# Patient Record
Sex: MALE | Race: WHITE | NOT HISPANIC OR LATINO
[De-identification: names, ages, dates, MRNs, and addresses within clinical notes are randomized per-mention and may not be internally consistent; named-entity substitution may affect disease eponyms.]

---

## 2022-07-07 ENCOUNTER — APPOINTMENT (OUTPATIENT)
Dept: SURGERY | Facility: CLINIC | Age: 77
End: 2022-07-07

## 2022-07-07 VITALS — HEIGHT: 68 IN | BODY MASS INDEX: 28.79 KG/M2 | WEIGHT: 190 LBS

## 2022-07-07 PROCEDURE — 99203 OFFICE O/P NEW LOW 30 MIN: CPT

## 2022-07-07 NOTE — ASSESSMENT
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  He is an avid fisherman.\par \par Physical examination demonstrates a large plum sized bulge in the right groin which is reducible with minimal to moderate difficulty consistent with a large symptomatic protruding right inguinal hernia warranting surgical repair.  There is no evidence of incarceration or strangulation, and the patient denies any symptoms of obstruction.  Examination of his left groin demonstrates a mild to moderate weakness but no obvious hernia.  Both testicles are normal.  His umbilical examination is unremarkable.  He is moderately overweight with a current BMI of 29.\par \par I explained the pros and cons of surgery, as well as all risks, benefits, indications and alternatives of the procedure and the patient understood and agreed.  Asad has a trip planned with his extended family to [facility] in late August.  He was scheduled for the repair of his right inguinal hernia with mesh on Thursday, September 15, 2022 under LOCAL with IV SED[facility] with presurgical testing waived.  He was encouraged to avoid heavy lifting and strenuous activity in the interim, of course.\par \par  We also discussed the importance of calorie restriction and healthy eating with regard to weight loss, hernia recurrence and his overall health.  He was given a prescription for a right inguinal truss to wear while fishing on vacation to minimize the risk of incarceration, obstruction and/or strangulation.

## 2022-07-07 NOTE — PHYSICAL EXAM
[JVD] : no jugular venous distention  [Normal Breath Sounds] : Normal breath sounds [No Rash or Lesion] : No rash or lesion [Alert] : alert [Calm] : calm [de-identified] : Healthy [de-identified] : Normal [de-identified] : Mildly protuberant abdomen [de-identified] : Normal testicles [de-identified] : Right inguinal hernia, reducible

## 2022-07-17 ENCOUNTER — LABORATORY RESULT (OUTPATIENT)
Age: 77
End: 2022-07-17

## 2022-07-19 NOTE — ASU PATIENT PROFILE, ADULT - FALL HARM RISK - UNIVERSAL INTERVENTIONS
Bed in lowest position, wheels locked, appropriate side rails in place/Call bell, personal items and telephone in reach/Instruct patient to call for assistance before getting out of bed or chair/Non-slip footwear when patient is out of bed/[place] to call system/Physically safe environment - no spills, clutter or unnecessary equipment/Purposeful Proactive Rounding/Room/bathroom lighting operational, light cord in reach

## 2022-07-20 ENCOUNTER — TRANSCRIPTION ENCOUNTER (OUTPATIENT)
Age: 77
End: 2022-07-20

## 2022-07-20 ENCOUNTER — OUTPATIENT (OUTPATIENT)
Dept: OUTPATIENT SERVICES | Facility: HOSPITAL | Age: 77
LOS: 1 days | Discharge: HOME | End: 2022-07-20

## 2022-07-20 ENCOUNTER — APPOINTMENT (OUTPATIENT)
Dept: SURGERY | Facility: AMBULATORY SURGERY CENTER | Age: 77
End: 2022-07-20

## 2022-07-20 VITALS
OXYGEN SATURATION: 97 % | TEMPERATURE: 98 F | RESPIRATION RATE: 18 BRPM | DIASTOLIC BLOOD PRESSURE: 68 MMHG | WEIGHT: 190.04 LBS | SYSTOLIC BLOOD PRESSURE: 151 MMHG | HEIGHT: 68 IN | HEART RATE: 62 BPM

## 2022-07-20 VITALS — SYSTOLIC BLOOD PRESSURE: 139 MMHG | HEART RATE: 68 BPM | RESPIRATION RATE: 17 BRPM | OXYGEN SATURATION: 98 %

## 2022-07-20 DIAGNOSIS — Z96.641 PRESENCE OF RIGHT ARTIFICIAL HIP JOINT: Chronic | ICD-10-CM

## 2022-07-20 DIAGNOSIS — Z98.890 OTHER SPECIFIED POSTPROCEDURAL STATES: Chronic | ICD-10-CM

## 2022-07-20 PROCEDURE — 49505 PRP I/HERN INIT REDUC >5 YR: CPT | Mod: RT

## 2022-07-20 RX ORDER — LOSARTAN POTASSIUM 100 MG/1
1 TABLET, FILM COATED ORAL
Qty: 0 | Refills: 0 | DISCHARGE

## 2022-07-20 RX ORDER — TRAMADOL HYDROCHLORIDE 50 MG/1
1 TABLET ORAL
Qty: 20 | Refills: 0
Start: 2022-07-20 | End: 2022-07-23

## 2022-07-20 NOTE — ASU DISCHARGE PLAN (ADULT/PEDIATRIC) - CARE PROVIDER_API CALL
Braulio Sifuentes)  Surgery  [address]  [place] [address]  Phone: [phone]  Fax: [phone]  Scheduled Appointment: 07/27/2022

## 2022-07-20 NOTE — CHART NOTE - NSCHARTNOTEFT_GEN_A_CORE
PACU ANESTHESIA ADMISSION NOTE      Procedure: Repair of right inguinal hernia with mesh      Post op diagnosis:  Inguinal hernia, right        ____  Intubated  TV:______       Rate: ______      FiO2: ______    __x__  Patent Airway    ____  Full return of protective reflexes    ____  Full recovery from anesthesia / back to baseline status    Vitals:  T(C): 36.6   HR: 62  BP: 151/68   RR: 18   SpO2: 97%     Mental Status:  __x__ Awake   _____ Alert   _____ Drowsy   _____ Sedated    Nausea/Vomiting:  ____ Yes, See Post - Op Orders      ___x_ No    Pain Scale (0-10):  _____    Treatment: ____ None    ___x_ See Post - Op/PCA Orders    Post - Operative Fluids:   ____ Oral   __x__ See Post - Op Orders    Plan: Discharge:   __x__Home       _____Floor     _____Critical Care    _____  Other:_________________    Comments:  report given to RN DC to pacu

## 2022-07-20 NOTE — ASU DISCHARGE PLAN (ADULT/PEDIATRIC) - NS MD DC FALL RISK RISK
For information on Fall & Injury Prevention, visit: [email or web address] OR  [email or web address] OR  https://www.cdc.gov/steadi/patient.html

## 2022-07-20 NOTE — ASU PREOP CHECKLIST - PATIENT'S PERSONAL PROPERTY GIVEN TO
Patient Education     Arthralgia    Arthralgia is the term for pain in or around the joint. It is a symptom, not a disease. This pain may involve one or more joints. In some cases, the pain moves from joint to joint.  There are many causes for joint pain. These include:    Injury    Osteoarthritis (wearing out of the joint surface)    Gout (inflammation of the joint due to crystals in the joint fluid)    Infection inside the joint      Bursitis (inflammation of the fluid-filled sacs around the joint)    Autoimmune disorders such as rheumatoid arthritis or lupus    Tendonitis (inflammation of chords that attach muscle to bone)  Home care    Rest the involved joint(s) until your symptoms improve.     You may be prescribed pain medicine. If none is prescribed, you may use acetaminophen or ibuprofen to control pain and inflammation.  Follow-up care  Follow up with your healthcare provider or as advised.  When to seek medical advice  Contact your healthcare provider right away if any of the following occurs:    Pain, swelling, or redness of joint increases    Pain worsens or recurs after a period of improvement    Pain moves to other joints    You cannot bear weight on the affected joint     You cannot move the affected joint    Joint appears deformed    New rash appears    Fever of 100.4 F (38 C) or higher, or as directed by your healthcare provider  Date Last Reviewed: 3/1/2017    0126-4839 [employer]. [address] [place] [address], [place] [address]. All rights reserved. This information is not intended as a substitute for professional medical care. Always follow your healthcare professional's instructions.           
locker/on unit

## 2022-07-20 NOTE — ASU PREOP CHECKLIST - AS BP NONINV METHOD
Additional Notes: Patient consent was obtained to proceed with the visit and recommended plan of care after discussion of all risks and benefits, including the risks of COVID-19 exposure. electronic

## 2022-07-24 DIAGNOSIS — Z88.0 ALLERGY STATUS TO PENICILLIN: ICD-10-CM

## 2022-07-24 DIAGNOSIS — K40.90 UNILATERAL INGUINAL HERNIA, WITHOUT OBSTRUCTION OR GANGRENE, NOT SPECIFIED AS RECURRENT: ICD-10-CM

## 2022-07-28 ENCOUNTER — APPOINTMENT (OUTPATIENT)
Dept: SURGERY | Facility: CLINIC | Age: 77
End: 2022-07-28

## 2022-07-28 DIAGNOSIS — K40.90 UNILATERAL INGUINAL HERNIA, W/OUT OBSTRUCTION OR GANGRENE, NOT SPECIFIED AS RECURRENT: ICD-10-CM

## 2022-07-28 PROCEDURE — 99024 POSTOP FOLLOW-UP VISIT: CPT

## 2022-07-28 NOTE — CONSULT LETTER
[Dear  ___] : Dear  [unfilled], [Courtesy Letter:] : I had the pleasure of seeing your patient, [unfilled], in my office today. [Please see my note below.] : Please see my note below. [Consult Closing:] : Thank you very much for allowing me to participate in the care of this patient.  If you have any questions, please do not hesitate to contact me. [FreeTextEntry3] : \par Sincerely,\par \par Rebecca Mccarthy PA-C, [facility]\par

## 2022-07-28 NOTE — ASSESSMENT
[FreeTextEntry1] : CICI SHAW underwent a very large right inguinal hernia repair with mesh with Dr. Sifuentes on 7/20/22  under local IV sedation without any problems or complications. His wound is clean, dry and intact. There is no evidence of erythema, seroma formation or infection. He is tolerating a diet and having normal bowel movements. He denies any significant postoperative pain or discomfort at this time.\par \par He was counseled and reassured. CICI was discharged from the office with no specific follow up necessary, but he knows to avoid any heavy lifting or strenuous activity for the next several weeks. \par \par \par \par

## 2023-11-23 ENCOUNTER — NON-APPOINTMENT (OUTPATIENT)
Age: 78
End: 2023-11-23

## 2024-02-09 PROBLEM — Q05.9 SPINA BIFIDA, UNSPECIFIED: Chronic | Status: ACTIVE | Noted: 2022-07-20

## 2024-02-09 PROBLEM — I10 ESSENTIAL (PRIMARY) HYPERTENSION: Chronic | Status: ACTIVE | Noted: 2022-07-20

## 2024-02-09 PROBLEM — M86.10 OTHER ACUTE OSTEOMYELITIS, UNSPECIFIED SITE: Chronic | Status: ACTIVE | Noted: 2022-07-20

## 2024-04-04 ENCOUNTER — APPOINTMENT (OUTPATIENT)
Dept: NEUROSURGERY | Facility: CLINIC | Age: 79
End: 2024-04-04
Payer: MEDICARE

## 2024-04-04 ENCOUNTER — NON-APPOINTMENT (OUTPATIENT)
Age: 79
End: 2024-04-04

## 2024-04-04 VITALS — WEIGHT: 175 LBS | BODY MASS INDEX: 26.52 KG/M2 | HEIGHT: 68 IN

## 2024-04-04 DIAGNOSIS — H40.9 UNSPECIFIED GLAUCOMA: ICD-10-CM

## 2024-04-04 DIAGNOSIS — Z78.9 OTHER SPECIFIED HEALTH STATUS: ICD-10-CM

## 2024-04-04 DIAGNOSIS — G47.30 SLEEP APNEA, UNSPECIFIED: ICD-10-CM

## 2024-04-04 DIAGNOSIS — R32 UNSPECIFIED URINARY INCONTINENCE: ICD-10-CM

## 2024-04-04 DIAGNOSIS — Z82.49 FAMILY HISTORY OF ISCHEMIC HEART DISEASE AND OTHER DISEASES OF THE CIRCULATORY SYSTEM: ICD-10-CM

## 2024-04-04 DIAGNOSIS — I10 ESSENTIAL (PRIMARY) HYPERTENSION: ICD-10-CM

## 2024-04-04 LAB
ALBUMIN SERPL ELPH-MCNC: 4.3 G/DL
ALP BLD-CCNC: 74 U/L
ALT SERPL-CCNC: 16 U/L
ANION GAP SERPL CALC-SCNC: 13 MMOL/L
AST SERPL-CCNC: 23 U/L
BILIRUB SERPL-MCNC: 0.4 MG/DL
BUN SERPL-MCNC: 18 MG/DL
CALCIUM SERPL-MCNC: 9.4 MG/DL
CHLORIDE SERPL-SCNC: 104 MMOL/L
CO2 SERPL-SCNC: 24 MMOL/L
CREAT SERPL-MCNC: 0.7 MG/DL
EGFR: 93 ML/MIN/1.73M2
GLUCOSE SERPL-MCNC: 98 MG/DL
POTASSIUM SERPL-SCNC: 4 MMOL/L
PROT SERPL-MCNC: 6.8 G/DL
SODIUM SERPL-SCNC: 141 MMOL/L

## 2024-04-04 PROCEDURE — 99204 OFFICE O/P NEW MOD 45 MIN: CPT

## 2024-04-04 NOTE — ASSESSMENT
[FreeTextEntry1] : 80yo M with a history of spinal surgery in 1995 for a lipoma removal followed by a CSF leak repair. Back pain and RLE radiculopathy commenced shortly after surgery. No recent physical therapy or interventional procedures.  Patient will complete new imaging in the form of MRIs cervical, thoracic and lumbar. Depending upon results, a possible discussion of a L5-S1 DRG can take place.   No concerns were identified during the visit today and all questions have been addressed.    I, Chaya De Anda, MS, FNP-BC, attest that this documentation has been prepared under the direction in and the presence of provider, Yissel Ireland MD, FAANS.    ATTESTATION: I personally reviewed with the PA/NP, this patient's history and physical exam findings, as documented above. I have discussed the relevant areas of concern, having direct implications to the presenting problems and illnesses, and I have personally examined all pertinent and positive and negative findings, which impact their neurosurgical treatment.    I, Dr. Ireland, attest that this documentation has been prepared by Chaya De Anda under my presence and direction

## 2024-04-04 NOTE — REVIEW OF SYSTEMS
[Negative] : Heme/Lymph [Difficulty Walking] : difficulty walking [As Noted in HPI] : as noted in HPI [Incontinence] : incontinence

## 2024-04-04 NOTE — HISTORY OF PRESENT ILLNESS
[de-identified] : Mr. SHAW is a pleasant 79-year-old RH male presenting to the neurosurgery office today for an initial consultation regarding his low back pain and RLE radiculopathy.   As a review, patient has undergone on 3/22/1995 removal of a lipoma and then on 4/22/1995 repair of a CSF leak. Patient endorses that he did not have any pain until after these surgeries. He now suffers with low back pain and RLE radiculopathy in a posterior fashion extending to his foot, all toes are numb. He has no ability for dorsiflexion of right foot. Ambulates with a rollator for increased safety measures. No recent falls. He has been incontinent of bladder since the surgeries in 1995. He consulted with Dr. Belle of pain management in the past but has not undergone any interventional procedures in over 10 years. No recent physical therapy. Advil is taken for relief.   DIAGNOSTIC TESTING: MRI Lumbar at 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   The patient has agreed to complete additional imaging in the form of a MRIs of the cervical, thoracic and a contrast lumbar. Upon review of the imaging, a L5-S1 DRG may be considered to target the RLE radiculopathy. All questions were answered today and he was grateful for the visit.   PHYSICAL EXAM: Constitutional: Well appearing, no distress HEENT: Normocephalic Atraumatic Psychiatric: Alert and oriented to all spheres, normal mood Pulmonary: No respiratory distress  Neurologic: CN II-XII grossly intact Palpation: + paravertebral tenderness to lower lumbar spine upon palpation  Strength: Full strength in all major muscle groups, no atrophy except 0/5 dorsiflexion to R foot  Sensation: Full sensation to light touch in all extremities Reflexes: 2+ patellar 2+ biceps 2+ ankle jerk  No Wilhelm's No Clonus  ROM  SLR negative b/l Gait: Antalgic

## 2024-04-04 NOTE — PHYSICAL EXAM
[Oriented To Time, Place, And Person] : oriented to person, place, and time [Person] : oriented to person [Place] : oriented to place [Time] : oriented to time [0] : L4/5 ankle dorsiflexors 0/5 [4] : L4/5 ankle dorsiflexors 4/5

## 2024-04-08 ENCOUNTER — OUTPATIENT (OUTPATIENT)
Dept: OUTPATIENT SERVICES | Facility: HOSPITAL | Age: 79
LOS: 1 days | End: 2024-04-08
Payer: MEDICARE

## 2024-04-08 DIAGNOSIS — Z98.890 OTHER SPECIFIED POSTPROCEDURAL STATES: Chronic | ICD-10-CM

## 2024-04-08 DIAGNOSIS — Z00.8 ENCOUNTER FOR OTHER GENERAL EXAMINATION: ICD-10-CM

## 2024-04-08 DIAGNOSIS — Q06.8 OTHER SPECIFIED CONGENITAL MALFORMATIONS OF SPINAL CORD: ICD-10-CM

## 2024-04-08 PROCEDURE — 72146 MRI CHEST SPINE W/O DYE: CPT

## 2024-04-08 PROCEDURE — 72146 MRI CHEST SPINE W/O DYE: CPT | Mod: 26,MH

## 2024-04-09 ENCOUNTER — NON-APPOINTMENT (OUTPATIENT)
Age: 79
End: 2024-04-09

## 2024-04-09 DIAGNOSIS — Q06.8 OTHER SPECIFIED CONGENITAL MALFORMATIONS OF SPINAL CORD: ICD-10-CM

## 2024-05-03 ENCOUNTER — RESULT REVIEW (OUTPATIENT)
Age: 79
End: 2024-05-03

## 2024-05-03 ENCOUNTER — OUTPATIENT (OUTPATIENT)
Dept: OUTPATIENT SERVICES | Facility: HOSPITAL | Age: 79
LOS: 1 days | End: 2024-05-03
Payer: MEDICARE

## 2024-05-03 DIAGNOSIS — Z96.641 PRESENCE OF RIGHT ARTIFICIAL HIP JOINT: Chronic | ICD-10-CM

## 2024-05-03 DIAGNOSIS — Z00.8 ENCOUNTER FOR OTHER GENERAL EXAMINATION: ICD-10-CM

## 2024-05-03 DIAGNOSIS — Q06.8 OTHER SPECIFIED CONGENITAL MALFORMATIONS OF SPINAL CORD: ICD-10-CM

## 2024-05-03 DIAGNOSIS — Z98.890 OTHER SPECIFIED POSTPROCEDURAL STATES: Chronic | ICD-10-CM

## 2024-05-03 PROCEDURE — 72158 MRI LUMBAR SPINE W/O & W/DYE: CPT

## 2024-05-03 PROCEDURE — A9579: CPT

## 2024-05-03 PROCEDURE — 72158 MRI LUMBAR SPINE W/O & W/DYE: CPT | Mod: 26,MH

## 2024-05-03 PROCEDURE — 72141 MRI NECK SPINE W/O DYE: CPT

## 2024-05-03 PROCEDURE — 72141 MRI NECK SPINE W/O DYE: CPT | Mod: 26,MH

## 2024-05-04 DIAGNOSIS — Q06.8 OTHER SPECIFIED CONGENITAL MALFORMATIONS OF SPINAL CORD: ICD-10-CM

## 2024-05-07 ENCOUNTER — NON-APPOINTMENT (OUTPATIENT)
Age: 79
End: 2024-05-07

## 2024-05-09 ENCOUNTER — RESULT REVIEW (OUTPATIENT)
Age: 79
End: 2024-05-09

## 2024-05-09 ENCOUNTER — APPOINTMENT (OUTPATIENT)
Dept: NEUROSURGERY | Facility: CLINIC | Age: 79
End: 2024-05-09
Payer: MEDICARE

## 2024-05-09 VITALS — BODY MASS INDEX: 26.52 KG/M2 | HEIGHT: 68 IN | WEIGHT: 175 LBS

## 2024-05-09 PROCEDURE — 99214 OFFICE O/P EST MOD 30 MIN: CPT

## 2024-05-09 NOTE — HISTORY OF PRESENT ILLNESS
[FreeTextEntry1] : CHIEF COMPLAINT:  This is an 79-year-old male who presents for neurosurgical revisit with regards to progressive low back pain with associated right lower extremity radiculopathy accompanied by weakness.  As review, he had undergone lipoma removal with CSF leak repair in 1995 now with a tethered cord.  Postprocedure he had responded quite well but over time he had noted progressive lower extremity radiculopathy most prominent onto the right with ambulatory limitations.  Patient used 2 canes for ambulation but as of late has required a rolling walker due to weakness into the right leg.  At his last encounter we had reviewed an MRI L-spine from [facility] which appeared to be an incomplete study therefore we had ordered additional testing which is now available for our review.  DIAGNOSTIC TESTING: MR L spine-[facility]- 5/3/2024-grade 2 anterolisthesis L5-S1, which is stable and unchanged from prior.  There is a clumping of the nerves consistent with tethered cord of the lumbar segment.  Severe biforaminal narrowing at that segment although this is also complicated due to prior surgical intervention at this level with an obscurity with regards to proper anatomy. L4-5 degenerative changes with severe bilateral neuroforaminal stenosis, and L2-3 degenerative changes with severe left neural foraminal stenosis. MR T spine- [facility]- 4/8/2024-no significant evidence of central canal stenosis. MR C spine- [facility]- 5/3/2024-diffuse degenerative changes appreciated.  There is evidence of central stenosis at C3-4, C5-6, C6-7.  At the C6-7 segment there is evidence of a listhesis with biforaminal narrowing throughout the cervical region to variable degrees.  EXAM: Neurologic: CN II-XII grossly intact Palpation: + paravertebral tenderness to lower lumbar spine upon palpation Strength: Full strength in all major muscle groups, no atrophy except 0/5 dorsiflexion to R foot Sensation: Full sensation to light touch in all extremities Reflexes: 2+ patellar 2+ biceps 2+ ankle jerk  No Wilhelm's No Clonus  ROM  SLR negative b/l Gait: Antalgic, using walker.

## 2024-05-09 NOTE — ASSESSMENT
[FreeTextEntry1] : This is an 79-year-old male who presents for neurosurgical revisit with regards to progressive right lower extremity radiculopathy with weakness into the extremity.  We had a concern for a central canal stenosis contributing to his lower extremity complaints therefore MR C-spine was performed which reveals evidence of central stenosis at multiple levels with a questionable mobile spondylolisthesis at C6-7.  X-ray C-spine flexion-extension warranted for my review  He is not a candidate for a DRG stim considering the S1 neuroforaminal anatomical limitations   He will return to the office in approximately 4 weeks for review of x-ray imaging; I will also present his case at spine and conference   ATTESTATION: I personally reviewed with the PA/NP, this patient's history and physical exam findings, as documented above. I have discussed the relevant areas of concern, having direct implications to the presenting problems and illnesses, and I have personally examined all pertinent and positive and negative findings, which impact their neurosurgical treatment.    I, Dr. Ireland, attest that this documentation has been prepared by FRANKLIN Quigley under my presence and direction

## 2024-05-13 ENCOUNTER — OUTPATIENT (OUTPATIENT)
Dept: OUTPATIENT SERVICES | Facility: HOSPITAL | Age: 79
LOS: 1 days | End: 2024-05-13
Payer: MEDICARE

## 2024-05-13 DIAGNOSIS — Z96.641 PRESENCE OF RIGHT ARTIFICIAL HIP JOINT: Chronic | ICD-10-CM

## 2024-05-13 DIAGNOSIS — Q06.8 OTHER SPECIFIED CONGENITAL MALFORMATIONS OF SPINAL CORD: ICD-10-CM

## 2024-05-13 DIAGNOSIS — Z98.890 OTHER SPECIFIED POSTPROCEDURAL STATES: Chronic | ICD-10-CM

## 2024-05-13 PROCEDURE — 72052 X-RAY EXAM NECK SPINE 6/>VWS: CPT | Mod: 26

## 2024-05-13 PROCEDURE — 72052 X-RAY EXAM NECK SPINE 6/>VWS: CPT

## 2024-05-14 DIAGNOSIS — Q06.8 OTHER SPECIFIED CONGENITAL MALFORMATIONS OF SPINAL CORD: ICD-10-CM

## 2024-05-20 ENCOUNTER — APPOINTMENT (OUTPATIENT)
Dept: NEUROSURGERY | Facility: CLINIC | Age: 79
End: 2024-05-20
Payer: MEDICARE

## 2024-05-20 VITALS — HEIGHT: 68 IN | BODY MASS INDEX: 26.52 KG/M2 | WEIGHT: 175 LBS

## 2024-05-20 DIAGNOSIS — Q06.8 OTHER SPECIFIED CONGENITAL MALFORMATIONS OF SPINAL CORD: ICD-10-CM

## 2024-05-20 DIAGNOSIS — M48.02 SPINAL STENOSIS, CERVICAL REGION: ICD-10-CM

## 2024-05-20 DIAGNOSIS — Z00.00 ENCOUNTER FOR GENERAL ADULT MEDICAL EXAMINATION W/OUT ABNORMAL FINDINGS: ICD-10-CM

## 2024-05-20 DIAGNOSIS — M48.061 SPINAL STENOSIS, LUMBAR REGION WITHOUT NEUROGENIC CLAUDICATION: ICD-10-CM

## 2024-05-20 PROCEDURE — 99214 OFFICE O/P EST MOD 30 MIN: CPT

## 2024-05-20 NOTE — REASON FOR VISIT
[Follow-Up: _____] : a [unfilled] follow-up visit [FreeTextEntry1] :  CHIEF COMPLAINT:  RLE radiculopathy. Denies neck pain   CONSERVATIVE MANAGEMENT TRIALED:       DIAGNOSTIC TESTING:  Xray cervical at  on 5/13/24

## 2024-05-20 NOTE — ASSESSMENT
[FreeTextEntry1] : This is an 79-year-old male who presents for neurosurgical revisit with regards to progressive low back pain with associated right lower extremity radiculopathy accompanied by weakness. Pain continued at this visit in RLE. Imaging reviewed cord tethered.  Plan -Discussed in depth elective spinal cord stimulator in relief of pain and option for 7-day trial of stimulator. Patient to give further thought. -Pt instructed to call with any questions or concerns -F/u as needed  KAMILAH Monte-ROSEANN Ireland MD   ATTESTATION: I personally reviewed with the PA/NP, this patient's history and physical exam findings, as documented above. I have discussed the relevant areas of concern, having direct implications to the presenting problems and illnesses, and I have personally examined all pertinent and positive and negative findings, which impact their neurosurgical treatment.    I, Dr. Ireland, attest that this documentation has been prepared by CITLALI Monte under my presence and direction

## 2024-05-20 NOTE — HISTORY OF PRESENT ILLNESS
[FreeTextEntry1] : This is an 79-year-old male who presents for neurosurgical revisit with regards to progressive low back pain with associated right lower extremity radiculopathy accompanied by weakness. As review, he had undergone lipoma removal with CSF leak repair in 1995 now with a tethered cord. Postprocedure he had responded quite well but over time he had noted progressive lower extremity radiculopathy most prominent onto the right with ambulatory limitations. Patient previously used 2 canes for ambulation but as of late has required a rolling walker due to weakness into the right leg. Patient reports today that he has continued RLE pain, notes that it has not improved but has worsened. Discussed spinal cord stimulator trial to aid in pain after in depth discussion patient to think about it and get back to office with decision.  Neurologic: CN II-XII grossly intact Palpation: + paravertebral tenderness to lower lumbar spine upon palpation Strength: Full strength in all major muscle groups, no atrophy except 0/5 dorsiflexion to R foot Sensation: Full sensation to light touch in all extremities Reflexes: 2+ patellar 2+ biceps 2+ ankle jerk  No Wilhelm's No Clonus  ROM  SLR negative b/l Gait: Antalgic, using walker.

## 2024-06-17 ENCOUNTER — APPOINTMENT (OUTPATIENT)
Dept: NEUROSURGERY | Facility: CLINIC | Age: 79
End: 2024-06-17